# Patient Record
Sex: MALE | Race: WHITE | NOT HISPANIC OR LATINO | Employment: OTHER | URBAN - METROPOLITAN AREA
[De-identification: names, ages, dates, MRNs, and addresses within clinical notes are randomized per-mention and may not be internally consistent; named-entity substitution may affect disease eponyms.]

---

## 2017-01-01 ENCOUNTER — TELEPHONE (OUTPATIENT)
Dept: TRANSPLANT | Facility: CLINIC | Age: 72
End: 2017-01-01

## 2017-01-01 ENCOUNTER — OFFICE VISIT (OUTPATIENT)
Dept: TRANSPLANT | Facility: CLINIC | Age: 72
End: 2017-01-01
Payer: MEDICARE

## 2017-01-01 VITALS
DIASTOLIC BLOOD PRESSURE: 58 MMHG | WEIGHT: 301.81 LBS | BODY MASS INDEX: 50.28 KG/M2 | TEMPERATURE: 99 F | HEART RATE: 72 BPM | RESPIRATION RATE: 17 BRPM | SYSTOLIC BLOOD PRESSURE: 106 MMHG | OXYGEN SATURATION: 97 % | HEIGHT: 65 IN

## 2017-01-01 DIAGNOSIS — K74.60 LIVER CIRRHOSIS SECONDARY TO NASH: Primary | ICD-10-CM

## 2017-01-01 DIAGNOSIS — K75.81 LIVER CIRRHOSIS SECONDARY TO NASH: Primary | ICD-10-CM

## 2017-01-01 DIAGNOSIS — K75.81 LIVER CIRRHOSIS SECONDARY TO NASH: ICD-10-CM

## 2017-01-01 DIAGNOSIS — K76.6 PORTAL HYPERTENSION: ICD-10-CM

## 2017-01-01 DIAGNOSIS — N18.6 ESRD (END STAGE RENAL DISEASE) ON DIALYSIS: ICD-10-CM

## 2017-01-01 DIAGNOSIS — Z99.2 ESRD (END STAGE RENAL DISEASE) ON DIALYSIS: ICD-10-CM

## 2017-01-01 DIAGNOSIS — K76.7 HEPATORENAL SYNDROME: ICD-10-CM

## 2017-01-01 DIAGNOSIS — K74.60 LIVER CIRRHOSIS SECONDARY TO NASH: ICD-10-CM

## 2017-01-01 DIAGNOSIS — Z76.82 ORGAN TRANSPLANT CANDIDATE: ICD-10-CM

## 2017-01-01 DIAGNOSIS — K72.10 END STAGE LIVER DISEASE: ICD-10-CM

## 2017-01-01 DIAGNOSIS — K76.82 HEPATIC ENCEPHALOPATHY: ICD-10-CM

## 2017-01-01 DIAGNOSIS — Z76.82 ORGAN TRANSPLANT CANDIDATE: Primary | ICD-10-CM

## 2017-01-01 DIAGNOSIS — R18.8 OTHER ASCITES: ICD-10-CM

## 2017-01-01 DIAGNOSIS — K74.60 CIRRHOSIS OF LIVER WITHOUT ASCITES, UNSPECIFIED HEPATIC CIRRHOSIS TYPE: Primary | ICD-10-CM

## 2017-01-01 PROCEDURE — 99214 OFFICE O/P EST MOD 30 MIN: CPT | Mod: PBBFAC,TXP,25 | Performed by: INTERNAL MEDICINE

## 2017-01-01 PROCEDURE — 99205 OFFICE O/P NEW HI 60 MIN: CPT | Mod: S$PBB,TXP,, | Performed by: INTERNAL MEDICINE

## 2017-01-01 PROCEDURE — 99999 PR PBB SHADOW E&M-EST. PATIENT-LVL IV: CPT | Mod: PBBFAC,TXP,, | Performed by: INTERNAL MEDICINE

## 2017-01-01 RX ORDER — SODIUM BICARBONATE 650 MG/1
650 TABLET ORAL 3 TIMES DAILY
COMMUNITY

## 2017-01-01 RX ORDER — LEVOTHYROXINE SODIUM 50 UG/1
50 TABLET ORAL DAILY
COMMUNITY

## 2017-01-01 RX ORDER — MIDODRINE HYDROCHLORIDE 5 MG/1
5 TABLET ORAL 2 TIMES DAILY WITH MEALS
COMMUNITY

## 2017-01-01 RX ORDER — LACTULOSE 10 G/15ML
30 SOLUTION ORAL; RECTAL 2 TIMES DAILY
COMMUNITY

## 2017-01-01 RX ORDER — BRIMONIDINE TARTRATE AND TIMOLOL MALEATE 2; 5 MG/ML; MG/ML
1 SOLUTION OPHTHALMIC 2 TIMES DAILY
COMMUNITY

## 2017-01-01 RX ORDER — MULTIVITAMIN
1 TABLET ORAL DAILY
COMMUNITY

## 2017-01-01 RX ORDER — PANTOPRAZOLE SODIUM 40 MG/1
40 TABLET, DELAYED RELEASE ORAL DAILY
COMMUNITY

## 2017-01-01 RX ORDER — BRINZOLAMIDE 10 MG/ML
1 SUSPENSION/ DROPS OPHTHALMIC 2 TIMES DAILY
COMMUNITY

## 2017-10-02 NOTE — TELEPHONE ENCOUNTER
----- Message from Hanna Snider sent at 10/2/2017 12:43 PM CDT -----  Contact: PT  Would like to schedule an appt for cirrhosis of the liver. Will be a NP with BC[employer] and Medicare insurance.     Please call

## 2017-10-25 NOTE — TELEPHONE ENCOUNTER
Initial referral received via fax from Dr Lalit Knapp's office.   Patient with Cirrhosis.  MELD 25  Referred for liver transplant for CONSULT   Referral completed and forwarded to Elizabeth Navas, Transplant Financial Services.      Insurance: Medicare Part A & B   [id number]      [employer]  [id number]

## 2017-10-26 NOTE — TELEPHONE ENCOUNTER
----- Message from Hanna Snider sent at 10/26/2017 12:03 PM CDT -----  Contact: Ida- Case Management  Please call top schedule appt,     Call

## 2017-10-26 NOTE — TELEPHONE ENCOUNTER
----- Message from Hanna Snider sent at 10/26/2017 12:45 PM CDT -----  Contact: Spouse  Returning Teion call     Call

## 2017-11-03 PROBLEM — R18.8 OTHER ASCITES: Status: ACTIVE | Noted: 2017-01-01

## 2017-11-03 PROBLEM — Z76.82 ORGAN TRANSPLANT CANDIDATE: Status: ACTIVE | Noted: 2017-01-01

## 2017-11-03 PROBLEM — N18.6 ESRD (END STAGE RENAL DISEASE) ON DIALYSIS: Status: ACTIVE | Noted: 2017-01-01

## 2017-11-03 PROBLEM — K76.82 HEPATIC ENCEPHALOPATHY: Status: ACTIVE | Noted: 2017-01-01

## 2017-11-03 PROBLEM — K76.6 PORTAL HYPERTENSION: Status: ACTIVE | Noted: 2017-01-01

## 2017-11-03 PROBLEM — Z99.2 ESRD (END STAGE RENAL DISEASE) ON DIALYSIS: Status: ACTIVE | Noted: 2017-01-01

## 2017-11-03 PROBLEM — K74.60 LIVER CIRRHOSIS SECONDARY TO NASH: Status: ACTIVE | Noted: 2017-01-01

## 2017-11-03 PROBLEM — K72.10 END STAGE LIVER DISEASE: Status: ACTIVE | Noted: 2017-01-01

## 2017-11-03 PROBLEM — K75.81 LIVER CIRRHOSIS SECONDARY TO NASH: Status: ACTIVE | Noted: 2017-01-01

## 2017-11-03 PROBLEM — K76.7 HEPATORENAL SYNDROME: Status: ACTIVE | Noted: 2017-01-01

## 2017-11-03 NOTE — PROGRESS NOTES
Transplant Hepatology (Transplant Evaluation) Consult Note    Referring provider: Lalit Knapp MD - Nephrology    PCP: Aden Deal MD - [place]    Urologist: Isaiah Foster MD - [place]    Chief complaint:   Chief Complaint   Patient presents with    Liver Transplant Pre-evaluation       HPI:  Felix Dallas is a 71 y.o. male that presents to Transplant Hepatology Clinic for consultation of had concerns including Liver Transplant Pre-evaluation.      Patient states that he was told he had fatty liver disease when he saw a specialist at [facility] 17 years ago. He was also told that he had iron metabolism problem and told to take lecithin, milk thistle.    PMH: kidney stones, multiple UTI/Urosepsis, DM 2, obesity.   [denies CAD, CVA, coronary stents]    He developed ascites 2 months ago and he was told he had cirrhosis. He had LVP x 4 in total, 9L removed.     On 10/2/17, he went to ER at [facility] with altered mental status - diagnosed with HE , also with CARRIE and hyponatremia. His renal function did not improve and he started hemodialysis on 10/4/17. He is still on HD M/W/F. He was told that he did not have CKD before and renal disease is from liver cirrhosis. However, he has hx of kidney stones problems, had ureteral stent in the past and multiple UTIs.     Alcohol: occasional, completely quit since Jan 2017    Liver disease:                   MANNING    MELD-Na:                         26  Child-Palacios Class:             C    Transplant status:             not under evaluation    Cirrhosis is decompensated with:    Ascites:                              Yes, had LVPx 4, none since started HD   SBP:                                  no  Hepatic Encephalopathy:   Yes, grade 1-3, on lactulose  Portal bleeding:                  no  HCC:                                  no    HRS:                                  yes  Hyponatremia:                   yes  Muscle wasting:                 yes   PVT:                                    no      Screening:  Last EGD: > 1 yr ago, ulcers  Last imaging study: /2/17: no HCC      MELD-Na score: 26 at 11/3/2017  7:08 AM  MELD score: 24 at 11/3/2017  7:08 AM  Calculated from:  Serum Creatinine: 6.3 mg/dL (Rounded to 4) at 11/3/2017  7:08 AM  Serum Sodium: 133 mmol/L at 11/3/2017  7:08 AM  Total Bilirubin: 1.1 mg/dL at 11/3/2017  7:08 AM  INR(ratio): 1.4 at 11/3/2017  7:08 AM  Age: 71 years    Kidney disease:  Was told it was HRS  Started on HD vis Rt IJ :   10/4/17 - dialysis center [facility], [place], [place]      Patient Active Problem List   Diagnosis    End stage liver disease    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    Organ transplant candidate    Other ascites    Portal hypertension    Hepatic encephalopathy    ESRD (end stage renal disease) on dialysis    Hepatorenal syndrome       Past Medical History:   Diagnosis Date    Atrial fibrillation     Cirrhosis     Diabetes mellitus     Disorder of kidney and ureter     Thyroid disease        Past Surgical History:   Procedure Laterality Date    EYE SURGERY      catract surgery     GANGLION CYST EXCISION      hand        Family History   Problem Relation Age of Onset    Heart disease Mother     Heart disease Father     COPD Father        Social History     Social History    Marital status:      Spouse name: N/A    Number of children: N/A    Years of education: N/A     Social History Main Topics    Smoking status: Former Smoker    Smokeless tobacco: Never Used      Comment: stopped smoking 50 years ago    Alcohol use Yes      Comment: social drinking family events - occasionally    Drug use: No    Sexual activity: Not Asked     Other Topics Concern    None     Social History Narrative    None       Current Outpatient Prescriptions   Medication Sig Dispense Refill    brimonidine-timolol (COMBIGAN) 0.2-0.5 % Drop Place 1 drop into both eyes 2 (two) times daily.      brinzolamide (AZOPT) 1 %  ophthalmic suspension Place 1 drop into the right eye 2 (two) times daily.       INSULIN NPH HUM/REG INSULIN HM (HUMULIN 70/30 SUBQ) Inject 40 Units into the skin 2 (two) times daily.      lactulose (CHRONULAC) 10 gram/15 mL solution Take 30 g by mouth 2 (two) times daily.      levothyroxine (SYNTHROID) 50 MCG tablet Take 50 mcg by mouth once daily.      midodrine (PROAMATINE) 5 MG Tab Take 5 mg by mouth 2 (two) times daily with meals.      multivitamin (THERAGRAN) per tablet Take 1 tablet by mouth once daily.      pantoprazole (PROTONIX) 40 MG tablet Take 40 mg by mouth once daily.      rifAXImin (XIFAXAN) 200 mg Tab Take 200 mg by mouth 3 (three) times daily.      sodium bicarbonate 650 MG tablet Take 650 mg by mouth 3 (three) times daily.       No current facility-administered medications for this visit.        Review of patient's allergies indicates:  No Known Allergies    Review of Systems   Constitutional: Positive for malaise/fatigue and weight loss. Negative for chills and fever.   HENT: Negative for congestion, nosebleeds and sore throat.    Eyes: Negative for blurred vision, double vision and photophobia.   Respiratory: Negative for cough, hemoptysis and shortness of breath.    Cardiovascular: Positive for leg swelling. Negative for chest pain, palpitations and orthopnea.   Gastrointestinal: Negative for abdominal pain, blood in stool, constipation, diarrhea, heartburn, melena, nausea and vomiting.   Genitourinary: Negative for dysuria.   Musculoskeletal: Negative for falls and joint pain.   Skin: Negative for itching and rash.   Neurological: Positive for weakness. Negative for dizziness and tremors.   Endo/Heme/Allergies: Does not bruise/bleed easily.   Psychiatric/Behavioral: Negative for depression and substance abuse. The patient has insomnia. The patient is not nervous/anxious.        Vitals:    11/03/17 0748   BP: (!) 106/58   Pulse: 72   Resp: 17   Temp: 98.6 °F (37 °C)   TempSrc: Oral  "  SpO2: 97%   Weight: (!) 136.9 kg (301 lb 13 oz)   Height: 5' 5" (1.651 m)       Physical Exam   Constitutional: He is oriented to person, place, and time. He appears well-developed and well-nourished. No distress.   HENT:   Head: Normocephalic and atraumatic.   Mouth/Throat: Oropharynx is clear and moist.   Patient was able to stand up easily and walk 10 feet without assistance.   Eyes: Conjunctivae and EOM are normal. Pupils are equal, round, and reactive to light. No scleral icterus.   Neck: Normal range of motion.   Cardiovascular: Normal rate, regular rhythm, normal heart sounds and intact distal pulses.    No murmur heard.  Pulmonary/Chest: Effort normal and breath sounds normal. No respiratory distress. He has no wheezes. He has no rales.   Abdominal: Soft. Normal appearance and bowel sounds are normal. He exhibits distension. He exhibits no shifting dullness, no pulsatile liver, no fluid wave, no ascites and no mass. There is no splenomegaly or hepatomegaly. There is no tenderness. There is no guarding. No hernia.   Small ascites   Musculoskeletal: He exhibits edema.   Neurological: He is alert and oriented to person, place, and time. He is not disoriented.   Asterixis absent   Skin: Skin is warm and dry. No rash noted. He is not diaphoretic.   Palmar erythema, Scattered spider angiomata on upper chest     Psychiatric: He has a normal mood and affect. His behavior is normal. His mood appears not anxious. His affect is not inappropriate. He is not agitated. He is communicative. He is attentive.   Nursing note and vitals reviewed.      LABS: I personally reviewed pertinent laboratory findings.    Lab Results   Component Value Date    ALT 39 11/03/2017    AST 67 (H) 11/03/2017    GGT 81 (H) 11/03/2017    ALKPHOS 166 (H) 11/03/2017    BILITOT 1.1 (H) 11/03/2017       No results found for: WBC, HGB, HCT, MCV, PLT    Lab Results   Component Value Date     (L) 11/03/2017    K 3.3 (L) 11/03/2017    CL 90 (L) " 11/03/2017    CO2 26 11/03/2017    BUN 41 (H) 11/03/2017    CREATININE 6.3 (H) 11/03/2017    CALCIUM 8.7 11/03/2017    ANIONGAP 17 (H) 11/03/2017    ESTGFRAFRICA 9.5 (A) 11/03/2017    EGFRNONAA 8.2 (A) 11/03/2017       Lab Results   Component Value Date    INR 1.4 (H) 11/03/2017       No results found for: SMOOTHMUSCAB, MITOAB  No results found for: IRON, TIBC, FERRITIN, SATURATEDIRO  No results found for: HAV, HEPAIGM, HEPBIGM, HEPBCAB, HBEAG, HEPCAB  No results found for: TSH  No results found for: ARPITA    No results found for: ABORH    No results found for: LABA1C, HGBA1C  No results found for: CHOL  No results found for: HDL  No results found for: LDLCALC  No results found for: TRIG  No results found for: CHOLHDL        Imaging:   No results found for this or any previous visit.    I personally reviewed recent cardiology studies available on the chart and from outside medical records.  I personally reviewed recent imaging studies available on the chart and from outside medical records.        Assessment:  71 y.o. male presenting with     1. 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 Organ transplant candidate    3. End stage liver disease    4. Other ascites    5. Portal hypertension    6. Hepatic encephalopathy    7. ESRD (end stage renal disease) on dialysis    8. Hepatorenal syndrome        MELD-Na score: 26 at 11/3/2017  7:08 AM  MELD score: 24 at 11/3/2017  7:08 AM  Calculated from:  Serum Creatinine: 6.3 mg/dL (Rounded to 4) at 11/3/2017  7:08 AM  Serum Sodium: 133 mmol/L at 11/3/2017  7:08 AM  Total Bilirubin: 1.1 mg/dL at 11/3/2017  7:08 AM  INR(ratio): 1.4 at 11/3/2017  7:08 AM  Age: 71 years    Functional Status: 40% - Disabled: requires special care and assistance  Physical Capacity: Wheelchair bound or more limited      CPT Class: C  MELD-Na: 26    MELD mainly driven by low sodium and hemodialysis status.    Decompensated liver disease with poor quality of life.    HRS/ESRD on HD - per patient's  nephrologist, patient had HRS. Prior to recent hospitalization, his renal function was in normal range per patient though he had hx of multiple UTIs from ureteral stones, ureteral stent placed/removed.   Hemodialysis started on 10/4/17 - 4 weeks now.      Recommendation(s):  - warrants SLK evaluation    Transplant Candidacy: Mr. Dallas is a 71 y.o. male with end-stage liver disease secondary to Cirrhosis: MANNING with MELD-Na: 26 and Child-Palacios Class:C  here for evaluation for possible OLT. Based on available information, patient is a potential liver transplant candidate but he is at high risk for frailty and medical co-morbidities. Patient will undergo liver transplant evaluation after financial approval is obtained. Patient will be presented to Liver Transplant Selection Committee after all tests and evaluations are complete.    - continue lactulose and rifaximin  - continue follow up with local Nephrology for dialysis  - will need to see Transplant Nephrology at [facility] for SLK candidacy  - continue physical therapy outpatient and improve mobility  - RTC in 6 weeks    Cirrhosis Counseling  - strict abstinence of alcohol use  - compliance with lactulose and rifaximin if you have developed hepatic encephalopathy (confusion due to high ammonia level)  - avoid non-steroidal anti-inflammatory drugs (NSAIDs) such as ibuprofen, Motrin, naprosyn, Alleve due to the risk of kidney damage  - can take acetaminophen (Tylenol), no more than 2000 mg per day  - low sodium (salt) 2 gram per day diet  - nutrition: 25-30kcal (calorie per body body weight in kilogram) per day  - no need to restrict protein in diet  - high protein diet: 1.2-1.5 gram/kg (protein per body weight in kilogram) per day to prevent muscle mass loss  - avoid fasting  - Late night snack with 50 gram of complex carbohydrates and protein  - resistance exercises for muscle strength  - avoid raw seafoods due to the risk of fatal Vibrio vulnificus infection  -  ultrasound or MRI of the liver every 6 months for liver cancer screening (you are at risk of developing liver cancer due to scar tissue in the liver)  - Upper endoscopy every 1-2 years to screen for varices in the stomach and foodpipe which can burst and cause fatal bleeding      A total of 60 minutes were spent face-to-face with the patient during this encounter and over half of that time was spent on counseling and coordination of care.  We discussed in depth the nature of the patient's liver disease, the management plan and liver transplant evaluation in details. I also educated the patient about lifestyle modifications which may improve hepatic steatosis, overweight/obesity, insulin resistance and high blood pressure issues.    I have sent communication to the referring physician and/or primary care provider.      Laure Perdomo MD  Staff Physician  Hepatology and Liver Transplant  [facility]  [facility] [place]

## 2017-11-03 NOTE — PATIENT INSTRUCTIONS
- Use sliding scale for lactulose as follows:  Take 1 cup 30 mL at 8AM. If no stool by 12 noon, take 30 mL more of lactulose. If <2 stools by 3 PM, take 30 mL more of lactulose. Goal is 3 soft stools daily.     Cirrhosis Counseling  - strict abstinence of alcohol use  - compliance with lactulose and rifaximin if you have developed hepatic encephalopathy (confusion due to high ammonia level)  - avoid non-steroidal anti-inflammatory drugs (NSAIDs) such as ibuprofen, Motrin, naprosyn, Alleve due to the risk of kidney damage  - can take acetaminophen (Tylenol), no more than 2000 mg per day  - low sodium (salt) 2 gram per day diet  - nutrition: 25-30kcal (calorie per body body weight in kilogram) per day  - no need to restrict protein in diet  - high protein diet: 1.2-1.5 gram/kg (protein per body weight in kilogram) per day to prevent muscle mass loss  - avoid fasting  - Late night snack with 50 gram of complex carbohydrates and protein  - resistance exercises for muscle strength  - avoid raw seafoods due to the risk of fatal Vibrio vulnificus infection  - ultrasound of the liver every 6 months for liver cancer screening (you are at risk of developing liver cancer due to scar tissue in the liver)  - Upper endoscopy every 1-2 years to screen for varices in the stomach and foodpipe which can burst and cause fatal bleeding

## 2017-11-03 NOTE — LETTER
November 3, 2017        Lalit Knapp  [id number]  [place] [address]  Phone: [phone]  Fax: [phone]             [facility]  [address]  [facility] [address]  Phone: [phone]   Patient: Felix Dallas   MR Number: [id number]   YOB: 1945   Date of Visit: 11/3/2017       Dear Dr. Lalit Knapp    Thank you for referring Felix Dallas to me for evaluation. Attached you will find relevant portions of my assessment and plan of care.    We will obtain financial approval and start simultaneous liver-kidney transplant evaluation.    If you have questions, please do not hesitate to call me. I look forward to following Felix Dallas along with you.    Sincerely,    Laure Perdomo MD    Enclosure    If you would like to receive this communication electronically, please contact [email or web address] or [phone] to request [employer] access.    [employer] is a tool which provides read-only access to select patient information with whom you have a relationship. Its easy to use and provides real time access to review your patients record including encounter summaries, notes, results, and demographic information.    If you feel you have received this communication in error or would no longer like to receive these types of communications, please e-mail [email or web address]

## 2017-11-03 NOTE — Clinical Note
Please obtain financial approval and schedule return for LT eval. MELD 26 but mainly driven by HD status and sodium.  - Transplant Nephrology eval for SLK - pls check vitamin A, D and zinc - pls check ferritin, iron sat - pls obtain colo report - will need EGD

## 2017-11-06 NOTE — TELEPHONE ENCOUNTER
Dr. Perdomo requesting to begin simultaneous liver / kidney outpatient transplant evaluation. Dx: Cirrhosis: Fatty Liver ([place]). MELD 26 (from 11/3/17).

## 2017-11-06 NOTE — PROGRESS NOTES
Appt. Card completed for patient to RTC in 6 weeks with MELD labs, Vitamins A & D, and Zinc labs. Requested financial clearance for outpatient SLK evaluation. Once obtained, will schedule for first available per pt. Choice.

## 2017-11-08 NOTE — TELEPHONE ENCOUNTER
Called patient and spoke with him and his wife, Vielka. I informed them that I received clearance from his insurance company to proceed with SLK evaluation. Discussed this process with them in detail and because he is on dialysis MWF's, they prefer to proceed with standard type eval. They prefer to consolidate to 2 weeks on the Tuesdays and Thursdays of those weeks.     Reports he had colonoscopy done within 5 years but not sure name of facility or MD at this time. States they will work on getting that report to us (our fax number was provided at this time). Reports EGD >2 years and understands that this will need to be done prior to eval. They are seeing their PCP tomorrow and will discuss scheduling this procedure locally. Will let me know when it's scheduled.

## 2017-11-27 NOTE — TELEPHONE ENCOUNTER
Received call from patient's wife. She wants to cancel his liver txp evaluation appts scheduled to begin tomorrow. She reports the patient got hypotensive in dialysis and was admitted to the hospital. Reports he is still currently admitted and has possible infection (unk source - working up). States she will call to re-schedule his liver txp eval appts. Once he is discharged.     Notified schedulers to cancel appts.

## 2018-03-06 DIAGNOSIS — Z99.2 ESRD (END STAGE RENAL DISEASE) ON DIALYSIS: Primary | ICD-10-CM

## 2018-03-06 DIAGNOSIS — K72.10 END STAGE LIVER DISEASE: ICD-10-CM

## 2018-03-06 DIAGNOSIS — N18.6 ESRD (END STAGE RENAL DISEASE) ON DIALYSIS: Primary | ICD-10-CM

## 2018-03-06 DIAGNOSIS — Z76.82 ORGAN TRANSPLANT CANDIDATE: ICD-10-CM

## 2018-03-09 ENCOUNTER — POST MORTEM DOCUMENTATION (OUTPATIENT)
Dept: TRANSPLANT | Facility: CLINIC | Age: 73
End: 2018-03-09